# Patient Record
Sex: FEMALE | Race: WHITE | NOT HISPANIC OR LATINO | Employment: OTHER | URBAN - METROPOLITAN AREA
[De-identification: names, ages, dates, MRNs, and addresses within clinical notes are randomized per-mention and may not be internally consistent; named-entity substitution may affect disease eponyms.]

---

## 2018-06-20 ENCOUNTER — OFFICE VISIT (OUTPATIENT)
Dept: OBGYN | Facility: CLINIC | Age: 73
End: 2018-06-20
Payer: MEDICARE

## 2018-06-20 VITALS — BODY MASS INDEX: 23.95 KG/M2 | SYSTOLIC BLOOD PRESSURE: 112 MMHG | DIASTOLIC BLOOD PRESSURE: 60 MMHG | WEIGHT: 131 LBS

## 2018-06-20 DIAGNOSIS — N64.4 MASTODYNIA OF RIGHT BREAST: Primary | ICD-10-CM

## 2018-06-20 PROCEDURE — 99213 OFFICE O/P EST LOW 20 MIN: CPT | Performed by: OBSTETRICS & GYNECOLOGY

## 2018-06-20 NOTE — PROGRESS NOTES
SUBJECTIVE:                                                   Lona Rahman is a 72 year old female who presents to clinic today for the following health issue(s):  Patient presents with:  Breast Problem      HPI:  Patient is on oral premarin for hormone replacement therapy  Prior hyst so no progestin  Had a needle biopsy of right breast this winter- benign  Has some slight discomfort now in right breast, not real pain  No breast mass or nipple discharge  No redness of breast skin    No LMP recorded. Patient has had a hysterectomy..   Patient is sexually active, No obstetric history on file..  Using none for Hysterectomy.    reports that she quit smoking about 11 years ago. Her smoking use included Cigarettes. She has a 39.00 pack-year smoking history. She has never used smokeless tobacco.    STD testing offered?  Declined    Health maintenance updated:  yes    Today's PHQ-2 Score: No flowsheet data found.  Today's PHQ-9 Score: No flowsheet data found.  Today's SHILOH-7 Score: No flowsheet data found.    Problem list and histories reviewed & adjusted, as indicated.  Additional history: as documented.    There is no problem list on file for this patient.    Past Surgical History:   Procedure Laterality Date     BLEPHAROPLASTY BILATERAL Bilateral 7/23/2015    Procedure: BLEPHAROPLASTY BILATERAL;  Surgeon: David Ferreira MD;  Location: [facility]     GYN SURGERY      HYSTERECTOMY      Social History   Substance Use Topics     Smoking status: Former Smoker     Packs/day: 1.00     Years: 39.00     Types: Cigarettes     Quit date: 6/4/2007     Smokeless tobacco: Never Used     Alcohol use Yes      Comment: 7 GLASSES OF WINE/ WEEK           Current Outpatient Prescriptions   Medication Sig     Acetaminophen (TYLENOL PO) Take 1,000 mg by mouth every 4 hours as needed for mild pain or fever     Estrogens Conjugated (PREMARIN PO) Take 0.625 mg by mouth daily     HYDROcodone-acetaminophen (NORCO) 5-325 MG per tablet Take  1 tablet by mouth every 6 hours as needed for pain Maximum of 4000 mg of acetaminophen in 24 hours.     loperamide (IMODIUM) 2 MG capsule Take 10 mg by mouth daily as needed for diarrhea      Naproxen Sodium (ALEVE PO) Take 440 mg by mouth every 12 hours as needed for moderate pain     nystatin-triamcinolone (MYCOLOG II) cream Apply topically 2 times daily as needed      ondansetron (ZOFRAN) 4 MG tablet Take 1 tablet (4 mg) by mouth every 8 hours as needed     traZODone (DESYREL) 100 MG tablet Take 1 tablet (100 mg) by mouth At Bedtime     Zolpidem Tartrate (AMBIEN PO) Take 5 mg by mouth nightly as needed for sleep     No current facility-administered medications for this visit.      No Known Allergies    ROS:  12 point review of systems negative other than symptoms noted below.  Breast: Tenderness    OBJECTIVE:     /60  Wt 131 lb (59.4 kg)  Breastfeeding? No  BMI 23.95 kg/m2  Body mass index is 23.95 kg/(m^2).    Exam:  Constitutional:  Appearance: Well nourished, well developed alert, in no acute distress  Chest:  Respiratory Effort:  Breathing unlabored  Breasts:  Inspection of Breasts:  No lymphadenopathy present; Palpation of Breasts and Axillae:  No masses present on palpation, no breast tenderness Axillary Lymph Nodes:  No lymphadenopathy present  Neurologic/Psychiatric:  Mental Status:  Oriented X3      In-Clinic Test Results:  No results found for this or any previous visit (from the past 24 hour(s)).    ASSESSMENT/PLAN:                                                        ICD-10-CM    1. Mastodynia of right breast N64.4        There are no Patient Instructions on file for this visit.    Patient needs follow up mammogram this summer  Stop oral estrogen and see if breast sensation goes away  Otherwise we will need to do breast ultrasound  Patient will return 2-3 months for follow up  Discussed estrogen risks, would be best to stop taking it      Chely Andino MD  [facility]  [facility]

## 2018-06-20 NOTE — MR AVS SNAPSHOT
"              After Visit Summary   2018    Lona Rahman    MRN: [id number]           Patient Information     Date Of Birth          1945        Visit Information        Provider Department      2018 12:20 PM Chely Andino MD [facility]        Today's Diagnoses     Mastodynia of right breast    -  1       Follow-ups after your visit        Who to contact     If you have questions or need follow up information about today's clinic visit or your schedule please contact [facility] directly at [phone].  Normal or non-critical lab and imaging results will be communicated to you by streamOncehart, letter or phone within 4 business days after the clinic has received the results. If you do not hear from us within 7 days, please contact the clinic through streamOncehart or phone. If you have a critical or abnormal lab result, we will notify you by phone as soon as possible.  Submit refill requests through [employer] or call your pharmacy and they will forward the refill request to us. Please allow 3 business days for your refill to be completed.          Additional Information About Your Visit        MyChart Information     [employer] lets you send messages to your doctor, view your test results, renew your prescriptions, schedule appointments and more. To sign up, go to [email or web address] . Click on \"Log in\" on the left side of the screen, which will take you to the Welcome page. Then click on \"Sign up Now\" on the right side of the page.     You will be asked to enter the access code listed below, as well as some personal information. Please follow the directions to create your username and password.     Your access code is: [email or web address]  Expires: 2018  1:03 PM     Your access code will  in 90 days. If you need help or a new code, please call your [place] clinic or [phone].        Care EveryWhere ID     This is your Care EveryWhere ID. This could be " used by other organizations to access your [place] medical records  [id number]        Your Vitals Were     Breastfeeding? BMI (Body Mass Index)                No 23.95 kg/m2           Blood Pressure from Last 3 Encounters:   06/20/18 112/60   07/23/15 133/87    Weight from Last 3 Encounters:   06/20/18 131 lb (59.4 kg)   07/23/15 133 lb (60.3 kg)              Today, you had the following     No orders found for display       Primary Care Provider Office Phone # Fax #    Tyler Prasad [phone]       [facility] [address] [facility] [address]        Equal Access to Services     MARY CARMEN SMITH : Hadii marques ortiz Sofiona, waaxda luqadaha, qaybta kaalmada ademorrisyada, raghu seymour. So [facility] [phone].    ATENCIÓN: Si habla español, tiene a soto disposición servicios gratuitos de asistencia lingüística. Llame al [phone].    We comply with applicable federal civil rights laws and [place] laws. We do not discriminate on the basis of race, color, national origin, age, disability, sex, sexual orientation, or gender identity.            Thank you!     Thank you for choosing [facility]  for your care. Our goal is always to provide you with excellent care. Hearing back from our patients is one way we can continue to improve our services. Please take a few minutes to complete the written survey that you may receive in the mail after your visit with us. Thank you!             Your Updated Medication List - Protect others around you: Learn how to safely use, store and throw away your medicines at www.disposemymeds.org.          This list is accurate as of 6/20/18  1:03 PM.  Always use your most recent med list.                   Brand Name Dispense Instructions for use Diagnosis    ALEVE PO      Take 440 mg by mouth every 12 hours as needed for moderate pain        AMBIEN PO      Take 5 mg by mouth nightly as needed for sleep         HYDROcodone-acetaminophen 5-325 MG per tablet    NORCO    20 tablet    Take 1 tablet by mouth every 6 hours as needed for pain Maximum of 4000 mg of acetaminophen in 24 hours.    Post-operative state       loperamide 2 MG capsule    IMODIUM     Take 10 mg by mouth daily as needed for diarrhea        nystatin-triamcinolone cream    MYCOLOG II     Apply topically 2 times daily as needed        ondansetron 4 MG tablet    ZOFRAN    18 tablet    Take 1 tablet (4 mg) by mouth every 8 hours as needed    Post-operative state       PREMARIN PO      Take 0.625 mg by mouth daily        traZODone 100 MG tablet    DESYREL    90 tablet    Take 1 tablet (100 mg) by mouth At Bedtime    Insomnia       TYLENOL PO      Take 1,000 mg by mouth every 4 hours as needed for mild pain or fever

## 2018-07-25 ENCOUNTER — HOSPITAL ENCOUNTER (OUTPATIENT)
Dept: BONE DENSITY | Facility: CLINIC | Age: 73
Discharge: HOME | End: 2018-07-25
Attending: FAMILY MEDICINE | Admitting: FAMILY MEDICINE
Payer: MEDICARE

## 2018-07-25 DIAGNOSIS — Z78.0 POSTMENOPAUSAL: ICD-10-CM

## 2018-07-25 PROCEDURE — 77080 DXA BONE DENSITY AXIAL: CPT

## 2023-08-11 ENCOUNTER — OFFICE VISIT (OUTPATIENT)
Dept: URGENT CARE | Facility: URGENT CARE | Age: 78
End: 2023-08-11
Payer: MEDICARE

## 2023-08-11 VITALS
OXYGEN SATURATION: 96 % | TEMPERATURE: 97.5 F | HEART RATE: 84 BPM | DIASTOLIC BLOOD PRESSURE: 91 MMHG | SYSTOLIC BLOOD PRESSURE: 142 MMHG

## 2023-08-11 DIAGNOSIS — R30.0 DYSURIA: Primary | ICD-10-CM

## 2023-08-11 LAB
ALBUMIN UR-MCNC: ABNORMAL MG/DL
APPEARANCE UR: ABNORMAL
BACTERIA #/AREA URNS HPF: ABNORMAL /HPF
BILIRUB UR QL STRIP: NEGATIVE
COLOR UR AUTO: YELLOW
GLUCOSE UR STRIP-MCNC: NEGATIVE MG/DL
HGB UR QL STRIP: ABNORMAL
KETONES UR STRIP-MCNC: NEGATIVE MG/DL
LEUKOCYTE ESTERASE UR QL STRIP: ABNORMAL
MUCOUS THREADS #/AREA URNS LPF: PRESENT /LPF
NITRATE UR QL: NEGATIVE
PH UR STRIP: 6 [PH] (ref 5–7)
RBC #/AREA URNS AUTO: ABNORMAL /HPF
SP GR UR STRIP: 1.02 (ref 1–1.03)
SQUAMOUS #/AREA URNS AUTO: ABNORMAL /LPF
UROBILINOGEN UR STRIP-ACNC: 1 E.U./DL
WBC #/AREA URNS AUTO: >100 /HPF

## 2023-08-11 PROCEDURE — 87086 URINE CULTURE/COLONY COUNT: CPT | Performed by: NURSE PRACTITIONER

## 2023-08-11 PROCEDURE — 99203 OFFICE O/P NEW LOW 30 MIN: CPT

## 2023-08-11 PROCEDURE — 81001 URINALYSIS AUTO W/SCOPE: CPT

## 2023-08-11 PROCEDURE — 87186 SC STD MICRODIL/AGAR DIL: CPT | Performed by: NURSE PRACTITIONER

## 2023-08-11 RX ORDER — MELOXICAM 15 MG/1
TABLET ORAL
COMMUNITY
Start: 2023-07-21

## 2023-08-11 RX ORDER — CEPHALEXIN 500 MG/1
500 CAPSULE ORAL 2 TIMES DAILY
Qty: 10 CAPSULE | Refills: 0 | Status: SHIPPED | OUTPATIENT
Start: 2023-08-11 | End: 2023-08-16

## 2023-08-11 NOTE — PROGRESS NOTES
Assessment & Plan     Dysuria  - UA Macroscopic with reflex to Microscopic and Culture - Clinic Collect  - UA Microscopic with Reflex to Culture  - Urine Culture  - cephALEXin (KEFLEX) 500 MG capsule  Dispense: 10 capsule; Refill: 0     Patient Instructions     Results for orders placed or performed in visit on 08/11/23   UA Macroscopic with reflex to Microscopic and Culture - Clinic Collect     Status: Abnormal    Specimen: Urine, Clean Catch   Result Value Ref Range    Color Urine Yellow Colorless, Straw, Light Yellow, Yellow    Appearance Urine Cloudy (A) Clear    Glucose Urine Negative Negative mg/dL    Bilirubin Urine Negative Negative    Ketones Urine Negative Negative mg/dL    Specific Gravity Urine 1.025 1.003 - 1.035    Blood Urine Large (A) Negative    pH Urine 6.0 5.0 - 7.0    Protein Albumin Urine Trace (A) Negative mg/dL    Urobilinogen Urine 1.0 0.2, 1.0 E.U./dL    Nitrite Urine Negative Negative    Leukocyte Esterase Urine Small (A) Negative   UA Microscopic with Reflex to Culture     Status: Abnormal   Result Value Ref Range    Bacteria Urine Few (A) None Seen /HPF    RBC Urine 25-50 (A) 0-2 /HPF /HPF    WBC Urine >100 (A) 0-5 /HPF /HPF    Squamous Epithelials Urine Moderate (A) None Seen /LPF    Mucus Urine Present (A) None Seen /LPF     UA suspicious for infection  UC pending  Push fluids  Pyridium PRN - urine will be dark orange   Antibiotics keflex for 5 days  Will call if bacteria is resistant to the antibiotic prescribed.    F/u in 1 week if persists or 3 days if worsening.         Return in about 1 week (around 8/18/2023) for with regular provider if symptoms persist.    [facility] Urgent Care Provider  [facility] Zamorano is a 77 year old female who presents to clinic today for the following health issues:  Chief Complaint   Patient presents with    UTI     X Tuesday - burning with urination      HPI    UTI    Onset of symptoms was 3day(s).  Course of illness is  worsening  Severity moderate  Current and associated symptoms dysuria, frequency, urgency, and burning  Treatment and measures tried Increase fluid intake and OTC advil or tylenol   Predisposing factors include none  Patient denies flank pain, temperature > 101 degrees F., vomiting, vaginal discharge, vaginal odor, and vaginal itching      Review of Systems  Constitutional, HEENT, cardiovascular, pulmonary, GI, , musculoskeletal, neuro, skin, endocrine and psych systems are negative, except as otherwise noted.      Objective    BP (!) 142/91   Pulse 84   Temp 97.5  F (36.4  C) (Tympanic)   SpO2 96%   Physical Exam   GENERAL: healthy, alert and no distress  RESP: lungs clear to auscultation - no rales, rhonchi or wheezes  CV: regular rate and rhythm, normal S1 S2, no S3 or S4, no murmur, click or rub, no peripheral edema and peripheral pulses strong  MS: no gross musculoskeletal defects noted, no edema  BACK: no CVA tenderness, no paralumbar tenderness

## 2023-08-11 NOTE — PATIENT INSTRUCTIONS
Results for orders placed or performed in visit on 08/11/23   UA Macroscopic with reflex to Microscopic and Culture - Clinic Collect     Status: Abnormal    Specimen: Urine, Clean Catch   Result Value Ref Range    Color Urine Yellow Colorless, Straw, Light Yellow, Yellow    Appearance Urine Cloudy (A) Clear    Glucose Urine Negative Negative mg/dL    Bilirubin Urine Negative Negative    Ketones Urine Negative Negative mg/dL    Specific Gravity Urine 1.025 1.003 - 1.035    Blood Urine Large (A) Negative    pH Urine 6.0 5.0 - 7.0    Protein Albumin Urine Trace (A) Negative mg/dL    Urobilinogen Urine 1.0 0.2, 1.0 E.U./dL    Nitrite Urine Negative Negative    Leukocyte Esterase Urine Small (A) Negative   UA Microscopic with Reflex to Culture     Status: Abnormal   Result Value Ref Range    Bacteria Urine Few (A) None Seen /HPF    RBC Urine 25-50 (A) 0-2 /HPF /HPF    WBC Urine >100 (A) 0-5 /HPF /HPF    Squamous Epithelials Urine Moderate (A) None Seen /LPF    Mucus Urine Present (A) None Seen /LPF     UA suspicious for infection  UC pending  Push fluids  Pyridium PRN - urine will be dark orange   Antibiotics keflex for 5 days  Will call if bacteria is resistant to the antibiotic prescribed.    F/u in 1 week if persists or 3 days if worsening.

## 2023-08-13 LAB — BACTERIA UR CULT: ABNORMAL
